# Patient Record
Sex: FEMALE | Race: ASIAN | NOT HISPANIC OR LATINO
[De-identification: names, ages, dates, MRNs, and addresses within clinical notes are randomized per-mention and may not be internally consistent; named-entity substitution may affect disease eponyms.]

---

## 2019-08-06 ENCOUNTER — APPOINTMENT (OUTPATIENT)
Dept: OPHTHALMOLOGY | Facility: CLINIC | Age: 59
End: 2019-08-06
Payer: COMMERCIAL

## 2019-08-06 ENCOUNTER — NON-APPOINTMENT (OUTPATIENT)
Age: 59
End: 2019-08-06

## 2019-08-06 PROCEDURE — 92134 CPTRZ OPH DX IMG PST SGM RTA: CPT

## 2019-08-06 PROCEDURE — 92004 COMPRE OPH EXAM NEW PT 1/>: CPT

## 2019-08-06 PROCEDURE — 92015 DETERMINE REFRACTIVE STATE: CPT

## 2020-07-13 ENCOUNTER — NON-APPOINTMENT (OUTPATIENT)
Age: 60
End: 2020-07-13

## 2020-07-13 ENCOUNTER — APPOINTMENT (OUTPATIENT)
Dept: OPHTHALMOLOGY | Facility: CLINIC | Age: 60
End: 2020-07-13
Payer: COMMERCIAL

## 2020-07-13 PROCEDURE — 92014 COMPRE OPH EXAM EST PT 1/>: CPT

## 2020-07-13 PROCEDURE — ZZZZZ: CPT

## 2020-07-13 PROCEDURE — 92134 CPTRZ OPH DX IMG PST SGM RTA: CPT

## 2020-07-15 ENCOUNTER — APPOINTMENT (OUTPATIENT)
Dept: OPHTHALMOLOGY | Facility: CLINIC | Age: 60
End: 2020-07-15
Payer: COMMERCIAL

## 2020-07-15 ENCOUNTER — NON-APPOINTMENT (OUTPATIENT)
Age: 60
End: 2020-07-15

## 2020-07-15 PROCEDURE — 92015 DETERMINE REFRACTIVE STATE: CPT

## 2020-07-15 PROCEDURE — 92012 INTRM OPH EXAM EST PATIENT: CPT

## 2022-03-07 ENCOUNTER — APPOINTMENT (OUTPATIENT)
Dept: PULMONOLOGY | Facility: CLINIC | Age: 62
End: 2022-03-07
Payer: COMMERCIAL

## 2022-03-07 VITALS
DIASTOLIC BLOOD PRESSURE: 80 MMHG | HEART RATE: 73 BPM | OXYGEN SATURATION: 97 % | SYSTOLIC BLOOD PRESSURE: 114 MMHG | HEIGHT: 60 IN | WEIGHT: 179 LBS | BODY MASS INDEX: 35.14 KG/M2 | TEMPERATURE: 97.8 F

## 2022-03-07 DIAGNOSIS — Z11.59 ENCOUNTER FOR SCREENING FOR OTHER VIRAL DISEASES: ICD-10-CM

## 2022-03-07 PROCEDURE — 99205 OFFICE O/P NEW HI 60 MIN: CPT

## 2022-03-08 NOTE — PHYSICAL EXAM
[No Acute Distress] : no acute distress [Normal Oropharynx] : normal oropharynx [No Neck Mass] : no neck mass [Normal S1, S2] : normal s1, s2 [Clear to Auscultation Bilaterally] : clear to auscultation bilaterally [No Abnormalities] : no abnormalities [No HSM] : no hsm [Normal Gait] : normal gait [No Cyanosis] : no cyanosis [No Edema] : no edema [No Rash] : no rash [Oriented x3] : oriented x3 [Normal Affect] : normal affect

## 2022-03-08 NOTE — HISTORY OF PRESENT ILLNESS
[Never] : never [TextBox_4] : She is a 61-year-old woman who presented with a chronic cough. She denied any history of pulmonary disease. She never smoked.\par \par The cough is nonproductive. She has been coughing for several months. She never had COVID. She works as a nurse's aide. She denied any constitutional symptoms.\par \par \par \par  [Difficulty Initiating Sleep] : does not have difficulty initiating sleep [Difficulty Maintaining Sleep] : does not have difficulty maintaining sleep

## 2022-03-08 NOTE — REVIEW OF SYSTEMS
[Postnasal Drip] : postnasal drip [Cough] : cough [Fever] : no fever [Sore Throat] : no sore throat [Hemoptysis] : no hemoptysis [Chest Tightness] : no chest tightness [Sputum] : no sputum [Dyspnea] : no dyspnea [Wheezing] : no wheezing [Chest Discomfort] : no chest discomfort [Hay Fever] : no hay fever [GERD] : no gerd [Myalgias] : no myalgias [Rash] : no rash [Anemia] : no anemia [Headache] : no headache [Anxiety] : no anxiety [Diabetes] : no diabetes [Thyroid Problem] : no thyroid problem

## 2022-03-08 NOTE — DISCUSSION/SUMMARY
[FreeTextEntry1] : She is a 61 year-old woman who presented with a chronic cough. She denied any history of pulmonary disease. She never smoked.\par \par The cough is nonproductive. She has been coughing for several months. She never had COVID. 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 A recent chest radiograph was reported as negative.Quantiferon was negative. \par \par Reactive airways disease is a consideration. Postnasal drip may also be an issue.\par \par A PFT will be obtained after a nasopharyngeal swab for COVID-19-PCR has been obtained and the result is negative. \par \par Further recommendations to follow the results of the above. \par \par

## 2022-03-18 ENCOUNTER — APPOINTMENT (OUTPATIENT)
Dept: PULMONOLOGY | Facility: CLINIC | Age: 62
End: 2022-03-18

## 2022-03-30 ENCOUNTER — APPOINTMENT (OUTPATIENT)
Dept: PULMONOLOGY | Facility: CLINIC | Age: 62
End: 2022-03-30
Payer: COMMERCIAL

## 2022-03-30 VITALS
SYSTOLIC BLOOD PRESSURE: 112 MMHG | DIASTOLIC BLOOD PRESSURE: 62 MMHG | BODY MASS INDEX: 34.18 KG/M2 | HEART RATE: 63 BPM | OXYGEN SATURATION: 97 % | TEMPERATURE: 97.8 F | WEIGHT: 175 LBS

## 2022-03-30 DIAGNOSIS — R05.9 COUGH, UNSPECIFIED: ICD-10-CM

## 2022-03-30 PROCEDURE — 99213 OFFICE O/P EST LOW 20 MIN: CPT | Mod: 25

## 2022-03-30 PROCEDURE — 94060 EVALUATION OF WHEEZING: CPT

## 2022-03-30 RX ORDER — FLUTICASONE FUROATE AND VILANTEROL TRIFENATATE 100; 25 UG/1; UG/1
100-25 POWDER RESPIRATORY (INHALATION) DAILY
Qty: 1 | Refills: 1 | Status: ACTIVE | COMMUNITY
Start: 2022-03-07 | End: 1900-01-01

## 2022-03-30 NOTE — REVIEW OF SYSTEMS
[Postnasal Drip] : postnasal drip [Cough] : cough [Fever] : no fever [Fatigue] : no fatigue [Sore Throat] : no sore throat [Chest Tightness] : no chest tightness [Sputum] : no sputum [Dyspnea] : no dyspnea [Wheezing] : no wheezing [Chest Discomfort] : no chest discomfort [Hay Fever] : no hay fever [GERD] : no gerd [Myalgias] : no myalgias [Rash] : no rash [Anemia] : no anemia [Headache] : no headache [Anxiety] : no anxiety [Diabetes] : no diabetes [Thyroid Problem] : no thyroid problem

## 2022-03-30 NOTE — PHYSICAL EXAM
[No Acute Distress] : no acute distress [No Neck Mass] : no neck mass [Normal S1, S2] : normal s1, s2 [Clear to Auscultation Bilaterally] : clear to auscultation bilaterally [No HSM] : no hsm [Normal Gait] : normal gait [No Cyanosis] : no cyanosis [No Edema] : no edema [No Rash] : no rash [Oriented x3] : oriented x3

## 2022-03-30 NOTE — HISTORY OF PRESENT ILLNESS
[Never] : never [TextBox_4] : She is a 61 year-old woman who presented with a chronic cough. She denied any history of pulmonary disease. She never smoked.\par \par Was started on Breo.\par \par Feels a little better. \par \par \par \par \par \par \par  [Difficulty Initiating Sleep] : does not have difficulty initiating sleep [Difficulty Maintaining Sleep] : does not have difficulty maintaining sleep

## 2022-03-30 NOTE — DISCUSSION/SUMMARY
[FreeTextEntry1] : She is a 61 year-old woman who presented with a chronic cough. She denied any history of pulmonary disease. She never smoked. \par \par Appears to have mild reactive airways disease. Her cough improved after Breo. \par \par Advised to continue Breo for one month and see how she feels. \par \par Follow up in 3 to 6 months. Sooner if necessary. \par \par \par \par \par

## 2022-04-11 PROBLEM — Z11.59 SCREENING FOR VIRAL DISEASE: Status: ACTIVE | Noted: 2022-03-07

## 2022-06-29 ENCOUNTER — APPOINTMENT (OUTPATIENT)
Dept: PULMONOLOGY | Facility: CLINIC | Age: 62
End: 2022-06-29

## 2022-07-17 ENCOUNTER — NON-APPOINTMENT (OUTPATIENT)
Age: 62
End: 2022-07-17

## 2022-07-21 ENCOUNTER — NON-APPOINTMENT (OUTPATIENT)
Age: 62
End: 2022-07-21

## 2022-07-21 ENCOUNTER — APPOINTMENT (OUTPATIENT)
Dept: OPHTHALMOLOGY | Facility: CLINIC | Age: 62
End: 2022-07-21

## 2022-07-21 PROCEDURE — 92202 OPSCPY EXTND ON/MAC DRAW: CPT

## 2022-07-21 PROCEDURE — 92014 COMPRE OPH EXAM EST PT 1/>: CPT

## 2022-08-03 ENCOUNTER — APPOINTMENT (OUTPATIENT)
Dept: OPHTHALMOLOGY | Facility: CLINIC | Age: 62
End: 2022-08-03

## 2022-08-03 ENCOUNTER — NON-APPOINTMENT (OUTPATIENT)
Age: 62
End: 2022-08-03

## 2022-08-03 PROCEDURE — 92012 INTRM OPH EXAM EST PATIENT: CPT

## 2024-04-27 ENCOUNTER — NON-APPOINTMENT (OUTPATIENT)
Age: 64
End: 2024-04-27

## 2024-05-20 ENCOUNTER — APPOINTMENT (OUTPATIENT)
Dept: OPHTHALMOLOGY | Facility: CLINIC | Age: 64
End: 2024-05-20
Payer: COMMERCIAL

## 2024-05-20 ENCOUNTER — NON-APPOINTMENT (OUTPATIENT)
Age: 64
End: 2024-05-20

## 2024-05-20 PROCEDURE — 92250 FUNDUS PHOTOGRAPHY W/I&R: CPT

## 2024-05-20 PROCEDURE — 99214 OFFICE O/P EST MOD 30 MIN: CPT
